# Patient Record
Sex: FEMALE | Race: BLACK OR AFRICAN AMERICAN | NOT HISPANIC OR LATINO | URBAN - METROPOLITAN AREA
[De-identification: names, ages, dates, MRNs, and addresses within clinical notes are randomized per-mention and may not be internally consistent; named-entity substitution may affect disease eponyms.]

---

## 2024-05-30 ENCOUNTER — EMERGENCY (EMERGENCY)
Facility: HOSPITAL | Age: 44
LOS: 1 days | Discharge: HOME | End: 2024-05-30
Attending: EMERGENCY MEDICINE | Admitting: EMERGENCY MEDICINE
Payer: COMMERCIAL

## 2024-05-30 VITALS
RESPIRATION RATE: 20 BRPM | OXYGEN SATURATION: 98 % | SYSTOLIC BLOOD PRESSURE: 147 MMHG | DIASTOLIC BLOOD PRESSURE: 79 MMHG | TEMPERATURE: 99 F | HEART RATE: 84 BPM

## 2024-05-30 PROCEDURE — 99285 EMERGENCY DEPT VISIT HI MDM: CPT

## 2024-05-31 VITALS
HEART RATE: 73 BPM | RESPIRATION RATE: 18 BRPM | SYSTOLIC BLOOD PRESSURE: 112 MMHG | TEMPERATURE: 98 F | DIASTOLIC BLOOD PRESSURE: 82 MMHG | OXYGEN SATURATION: 98 %

## 2024-05-31 LAB
ALBUMIN SERPL ELPH-MCNC: 4.4 G/DL — SIGNIFICANT CHANGE UP (ref 3.3–5)
ALP SERPL-CCNC: 81 U/L — SIGNIFICANT CHANGE UP (ref 40–120)
ALT FLD-CCNC: 10 U/L — SIGNIFICANT CHANGE UP (ref 4–33)
ANION GAP SERPL CALC-SCNC: 13 MMOL/L — SIGNIFICANT CHANGE UP (ref 7–14)
APPEARANCE UR: CLEAR — SIGNIFICANT CHANGE UP
AST SERPL-CCNC: 15 U/L — SIGNIFICANT CHANGE UP (ref 4–32)
BASOPHILS # BLD AUTO: 0.03 K/UL — SIGNIFICANT CHANGE UP (ref 0–0.2)
BASOPHILS NFR BLD AUTO: 0.4 % — SIGNIFICANT CHANGE UP (ref 0–2)
BILIRUB SERPL-MCNC: 0.5 MG/DL — SIGNIFICANT CHANGE UP (ref 0.2–1.2)
BILIRUB UR-MCNC: NEGATIVE — SIGNIFICANT CHANGE UP
BUN SERPL-MCNC: 11 MG/DL — SIGNIFICANT CHANGE UP (ref 7–23)
CALCIUM SERPL-MCNC: 9.7 MG/DL — SIGNIFICANT CHANGE UP (ref 8.4–10.5)
CHLORIDE SERPL-SCNC: 96 MMOL/L — LOW (ref 98–107)
CO2 SERPL-SCNC: 24 MMOL/L — SIGNIFICANT CHANGE UP (ref 22–31)
COLOR SPEC: YELLOW — SIGNIFICANT CHANGE UP
CREAT SERPL-MCNC: 0.82 MG/DL — SIGNIFICANT CHANGE UP (ref 0.5–1.3)
DIFF PNL FLD: NEGATIVE — SIGNIFICANT CHANGE UP
EGFR: 91 ML/MIN/1.73M2 — SIGNIFICANT CHANGE UP
EOSINOPHIL # BLD AUTO: 0.07 K/UL — SIGNIFICANT CHANGE UP (ref 0–0.5)
EOSINOPHIL NFR BLD AUTO: 1 % — SIGNIFICANT CHANGE UP (ref 0–6)
FLUAV AG NPH QL: SIGNIFICANT CHANGE UP
FLUBV AG NPH QL: SIGNIFICANT CHANGE UP
GLUCOSE SERPL-MCNC: 108 MG/DL — HIGH (ref 70–99)
GLUCOSE UR QL: NEGATIVE MG/DL — SIGNIFICANT CHANGE UP
HCG SERPL-ACNC: <1 MIU/ML — SIGNIFICANT CHANGE UP
HCT VFR BLD CALC: 45.5 % — HIGH (ref 34.5–45)
HGB BLD-MCNC: 14.6 G/DL — SIGNIFICANT CHANGE UP (ref 11.5–15.5)
IANC: 5.33 K/UL — SIGNIFICANT CHANGE UP (ref 1.8–7.4)
IMM GRANULOCYTES NFR BLD AUTO: 0.4 % — SIGNIFICANT CHANGE UP (ref 0–0.9)
KETONES UR-MCNC: NEGATIVE MG/DL — SIGNIFICANT CHANGE UP
LEUKOCYTE ESTERASE UR-ACNC: NEGATIVE — SIGNIFICANT CHANGE UP
LYMPHOCYTES # BLD AUTO: 1.21 K/UL — SIGNIFICANT CHANGE UP (ref 1–3.3)
LYMPHOCYTES # BLD AUTO: 17.1 % — SIGNIFICANT CHANGE UP (ref 13–44)
MCHC RBC-ENTMCNC: 26.5 PG — LOW (ref 27–34)
MCHC RBC-ENTMCNC: 32.1 GM/DL — SIGNIFICANT CHANGE UP (ref 32–36)
MCV RBC AUTO: 82.6 FL — SIGNIFICANT CHANGE UP (ref 80–100)
MONOCYTES # BLD AUTO: 0.41 K/UL — SIGNIFICANT CHANGE UP (ref 0–0.9)
MONOCYTES NFR BLD AUTO: 5.8 % — SIGNIFICANT CHANGE UP (ref 2–14)
NEUTROPHILS # BLD AUTO: 5.33 K/UL — SIGNIFICANT CHANGE UP (ref 1.8–7.4)
NEUTROPHILS NFR BLD AUTO: 75.3 % — SIGNIFICANT CHANGE UP (ref 43–77)
NITRITE UR-MCNC: NEGATIVE — SIGNIFICANT CHANGE UP
NRBC # BLD: 0 /100 WBCS — SIGNIFICANT CHANGE UP (ref 0–0)
NRBC # FLD: 0 K/UL — SIGNIFICANT CHANGE UP (ref 0–0)
PH UR: 8.5 (ref 5–8)
PLATELET # BLD AUTO: 330 K/UL — SIGNIFICANT CHANGE UP (ref 150–400)
POTASSIUM SERPL-MCNC: 5.2 MMOL/L — SIGNIFICANT CHANGE UP (ref 3.5–5.3)
POTASSIUM SERPL-SCNC: 5.2 MMOL/L — SIGNIFICANT CHANGE UP (ref 3.5–5.3)
PROT SERPL-MCNC: 7.9 G/DL — SIGNIFICANT CHANGE UP (ref 6–8.3)
PROT UR-MCNC: NEGATIVE MG/DL — SIGNIFICANT CHANGE UP
RBC # BLD: 5.51 M/UL — HIGH (ref 3.8–5.2)
RBC # FLD: 12.9 % — SIGNIFICANT CHANGE UP (ref 10.3–14.5)
RSV RNA NPH QL NAA+NON-PROBE: SIGNIFICANT CHANGE UP
SARS-COV-2 RNA SPEC QL NAA+PROBE: SIGNIFICANT CHANGE UP
SODIUM SERPL-SCNC: 133 MMOL/L — LOW (ref 135–145)
SP GR SPEC: 1.01 — SIGNIFICANT CHANGE UP (ref 1–1.03)
TROPONIN T, HIGH SENSITIVITY RESULT: <6 NG/L — SIGNIFICANT CHANGE UP
UROBILINOGEN FLD QL: 0.2 MG/DL — SIGNIFICANT CHANGE UP (ref 0.2–1)
WBC # BLD: 7.08 K/UL — SIGNIFICANT CHANGE UP (ref 3.8–10.5)
WBC # FLD AUTO: 7.08 K/UL — SIGNIFICANT CHANGE UP (ref 3.8–10.5)

## 2024-05-31 PROCEDURE — 71046 X-RAY EXAM CHEST 2 VIEWS: CPT | Mod: 26

## 2024-05-31 PROCEDURE — 74177 CT ABD & PELVIS W/CONTRAST: CPT | Mod: 26,MC

## 2024-05-31 PROCEDURE — 93010 ELECTROCARDIOGRAM REPORT: CPT

## 2024-05-31 RX ORDER — KETOROLAC TROMETHAMINE 30 MG/ML
15 SYRINGE (ML) INJECTION ONCE
Refills: 0 | Status: DISCONTINUED | OUTPATIENT
Start: 2024-05-31 | End: 2024-05-31

## 2024-05-31 RX ORDER — METOCLOPRAMIDE HCL 10 MG
10 TABLET ORAL ONCE
Refills: 0 | Status: COMPLETED | OUTPATIENT
Start: 2024-05-31 | End: 2024-05-31

## 2024-05-31 RX ORDER — ONDANSETRON 8 MG/1
1 TABLET, FILM COATED ORAL
Qty: 6 | Refills: 0
Start: 2024-05-31 | End: 2024-06-01

## 2024-05-31 RX ORDER — SODIUM CHLORIDE 9 MG/ML
1000 INJECTION INTRAMUSCULAR; INTRAVENOUS; SUBCUTANEOUS ONCE
Refills: 0 | Status: COMPLETED | OUTPATIENT
Start: 2024-05-31 | End: 2024-05-31

## 2024-05-31 RX ORDER — ACETAMINOPHEN 500 MG
1000 TABLET ORAL ONCE
Refills: 0 | Status: COMPLETED | OUTPATIENT
Start: 2024-05-31 | End: 2024-05-31

## 2024-05-31 RX ORDER — MAGNESIUM SULFATE 500 MG/ML
1 VIAL (ML) INJECTION ONCE
Refills: 0 | Status: COMPLETED | OUTPATIENT
Start: 2024-05-31 | End: 2024-05-31

## 2024-05-31 RX ADMIN — SODIUM CHLORIDE 1000 MILLILITER(S): 9 INJECTION INTRAMUSCULAR; INTRAVENOUS; SUBCUTANEOUS at 02:42

## 2024-05-31 RX ADMIN — Medication 100 GRAM(S): at 05:23

## 2024-05-31 RX ADMIN — Medication 15 MILLIGRAM(S): at 02:42

## 2024-05-31 RX ADMIN — Medication 10 MILLIGRAM(S): at 02:42

## 2024-05-31 RX ADMIN — Medication 15 MILLIGRAM(S): at 05:23

## 2024-05-31 RX ADMIN — Medication 400 MILLIGRAM(S): at 02:42

## 2024-05-31 NOTE — ED ADULT NURSE NOTE - NSFALLUNIVINTERV_ED_ALL_ED
Bed/Stretcher in lowest position, wheels locked, appropriate side rails in place/Call bell, personal items and telephone in reach/Instruct patient to call for assistance before getting out of bed/chair/stretcher/Non-slip footwear applied when patient is off stretcher/[place] to call system/Physically safe environment - no spills, clutter or unnecessary equipment/Purposeful proactive rounding/Room/bathroom lighting operational, light cord in reach

## 2024-05-31 NOTE — ED PROVIDER NOTE - ATTENDING CONTRIBUTION TO CARE
HPI: 43-year-old female with no past medical history and only knee surgery that presents with right lower quadrant abdominal pain nonradiating associated with nausea, vomiting, chest pain, muscle aches, chills, cough, headaches for 3 days.  Patient states that started having symptoms gradually onset but worsening over the last 3 days.  Tried Excedrin and ibuprofen at home last taken 2 PM which is approximately 12 hours prior to arrival but little relief.  Has been otherwise able to tolerate p.o. although today started having nausea vomiting while in the waiting room.  Reports lightheadedness but no recent trauma.  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  Denies any vision changes, shortness of breath, joint pains, dysuria or diarrhea.  LMP was middle of the month but does not think she is pregnant but unsure not on birth control.  Denies any smoking drinking or drugs.  Usually has 1 bowel movement every 1 to 2 days and last bowel movement was normal today without any blood or diarrhea.    EXAM: Uncomfortable appearing but awake and alert.  Speaking full sentences.  Head is atraumatic eyes extraocular moods intact lungs are equal bilaterally with normal breath sounds heart is regular rate and rhythm abdomen is diffusely soft although tender in the right lower quadrant without rebound or guarding or masses.  Also tender in the suprapubic region.  No rebound or guarding.  Positive CVA tenderness bilaterally.  Skin without changes.    MDM: 43-year-old female with no past medical history and only knee surgery that presents with right lower quadrant abdominal pain and nausea vomiting while in the waiting room but also has had chest pain, muscle soreness and aches chills and cough and headache for the last 3 days.  Concern is for infectious etiology at this time.  Her vital signs are noted to be hypertensive but borderline fever with a oral temp of 99.3 but not tachycardic.  The concern is for possible colitis versus appendicitis versus UTI versus flu/COVID versus pneumonia.  At this time will obtain workup provide IV fluids and medications obtain CT and x-ray and reassess.

## 2024-05-31 NOTE — ED PROVIDER NOTE - CLINICAL SUMMARY MEDICAL DECISION MAKING FREE TEXT BOX
43-year-old female, previously healthy, presenting with nausea, vomiting, chest pain, muscle aches, chills, cough, headache for 3 days.      VS show HTN otherwise WNL.     GENERAL: NAD, lying in bed comfortably  HEAD:  Atraumatic, Normocephalic  EYES: EOMI, conjunctiva and sclera clear  ENT: Moist mucous membranes  NECK: Supple  CHEST/LUNG: Unlabored respirations. Clear to auscultation bilaterally. No rales, rhonchi, wheezing, or rubs  HEART: Regular rate and rhythm. No murmurs, rubs, or gallops  ABDOMEN: Soft, nondistended, nontender  EXTREMITIES:  No cyanosis or edema. Brisk capillary refill  NERVOUS SYSTEM:  No focal deficits. A&Ox3  SKIN: No rashes or lesions     ddx include but not limited to viral gastroenteritis/uri, pregnancy. Will assess for ACS, PNA, PTX. Will assess for dehydration 43-year-old female, previously healthy, presenting with nausea, vomiting, chest pain, muscle aches, chills, cough, headache for 3 days.      VS show HTN otherwise WNL.     GENERAL: NAD, lying in bed comfortably  HEAD:  Atraumatic, Normocephalic  EYES: EOMI, conjunctiva and sclera clear  ENT: Moist mucous membranes  NECK: Supple  CHEST/LUNG: Unlabored respirations. Clear to auscultation bilaterally. No rales, rhonchi, wheezing, or rubs  HEART: Regular rate and rhythm. No murmurs, rubs, or gallops  ABDOMEN: Soft, nondistended, RLQ ttp  EXTREMITIES:  No cyanosis or edema. Brisk capillary refill  NERVOUS SYSTEM:  No focal deficits. A&Ox3  SKIN: No rashes or lesions     ddx include but not limited to viral gastroenteritis/uri, pregnancy, appendicitis. Will assess for ACS, PNA, PTX. Will assess for dehydration 43-year-old female, previously healthy, presenting with nausea, vomiting, chest pain, muscle aches, chills, cough, headache for 3 days.      VS show HTN otherwise WNL.     GENERAL: NAD, lying in bed comfortably  HEAD:  Atraumatic, Normocephalic  EYES: EOMI, conjunctiva and sclera clear  ENT: Moist mucous membranes  NECK: Supple  CHEST/LUNG: Unlabored respirations. Clear to auscultation bilaterally. No rales, rhonchi, wheezing, or rubs  HEART: Regular rate and rhythm. No murmurs, rubs, or gallops  ABDOMEN: Soft, nondistended, RLQ ttp  EXTREMITIES:  No cyanosis or edema. Brisk capillary refill  NERVOUS SYSTEM:  No focal deficits. A&Ox3. No nuchal rigidity  SKIN: No rashes or lesions     ddx include but not limited to viral gastroenteritis/uri, pregnancy, appendicitis. Will assess for ACS, PNA, PTX. Will assess for dehydration

## 2024-05-31 NOTE — ED PROVIDER NOTE - NSFOLLOWUPINSTRUCTIONS_ED_ALL_ED_FT
You were seen in the emergency department for vomiting      1) Follow-up with your primary care provider in 1-2 days.     2) Continue to take all medications as prescribed.    3) Rest and stay hydrated. Pain can be managed with Acetaminophen (aka Tylenol)  over the counter as directed.    4) Return to the ER for any new or worsening symptoms. Some symptoms to look out for include worsening chest pain, shortness of breath, breaking out into sweats, passing out, vomiting, or any other worsening concerning symptoms.       Please read all attached patient information.

## 2024-05-31 NOTE — ED PROVIDER NOTE - OBJECTIVE STATEMENT
43-year-old female, previously healthy, presenting with nausea, vomiting, chest pain, muscle aches, chills, cough, headache for 3 days.  Patient reports that she has been having poor p.o. tolerance last 2 days.  Reports lightheadedness.  Denies any changes in vision, shortness of breath, joint pains, dysuria, diarrhea, recent travel, sick contacts.

## 2024-05-31 NOTE — ED ADULT NURSE REASSESSMENT NOTE - NS ED NURSE REASSESS COMMENT FT1
Per ED Provider, Pt cleared to be d/c home. Peripheral IV discontinued at this time. A/Ox4 ambulated w/ steady gait. Vital signs stable.

## 2024-05-31 NOTE — ED PROVIDER NOTE - PATIENT PORTAL LINK FT
You can access the FollowMyHealth Patient Portal offered by [facility] by registering at the following website: [email or web address]. By joining [facility]’s FollowMyHealth portal, you will also be able to view your health information using other applications (apps) compatible with our system.

## 2024-05-31 NOTE — ED PROVIDER NOTE - PHYSICAL EXAMINATION
GENERAL: NAD, lying in bed comfortably  HEAD:  Atraumatic, Normocephalic  EYES: EOMI, conjunctiva and sclera clear  ENT: Moist mucous membranes  NECK: Supple  CHEST/LUNG: Unlabored respirations. Clear to auscultation bilaterally. No rales, rhonchi, wheezing, or rubs  HEART: Regular rate and rhythm. No murmurs, rubs, or gallops  ABDOMEN: Soft, nondistended, nontender  EXTREMITIES:  No cyanosis or edema. Brisk capillary refill  NERVOUS SYSTEM:  No focal deficits. A&Ox3  SKIN: No rashes or lesions GENERAL: NAD, lying in bed comfortably  HEAD:  Atraumatic, Normocephalic  EYES: EOMI, conjunctiva and sclera clear  ENT: Moist mucous membranes  NECK: Supple  CHEST/LUNG: Unlabored respirations. Clear to auscultation bilaterally. No rales, rhonchi, wheezing, or rubs  HEART: Regular rate and rhythm. No murmurs, rubs, or gallops  ABDOMEN: Soft, nondistended, nontender  EXTREMITIES:  No cyanosis or edema. Brisk capillary refill  NERVOUS SYSTEM:  No focal deficits. A&Ox3. No nuchal rigidity  SKIN: No rashes or lesions

## 2024-05-31 NOTE — ED ADULT NURSE NOTE - OBJECTIVE STATEMENT
Pt received from triage on stretcher w/  at the bedside, A/Ox4 answers all questions appropriately. C/O Chest pain w/ headache x 3 days. Pt denies SOB during initial assessment, breathing is even and unlabored on room air. Abdomen is soft and non-distended. Pt ambulates independently at baseline, moves all four extremities on command, skin is intact. Pt resting comfortably at the bedside, No apparent acute visible distress noted on initial assessment. Vital signs stable, NKA to medications. Denies significant PMHx. 20g peripheral IV placed on left forearm, labs obtained and sent, meds administered per eMAR. Pending XR/CT imaging

## 2024-06-01 LAB
CULTURE RESULTS: SIGNIFICANT CHANGE UP
SPECIMEN SOURCE: SIGNIFICANT CHANGE UP

## 2024-11-08 ENCOUNTER — EMERGENCY (EMERGENCY)
Facility: HOSPITAL | Age: 44
LOS: 1 days | Discharge: HOME | End: 2024-11-08
Attending: STUDENT IN AN ORGANIZED HEALTH CARE EDUCATION/TRAINING PROGRAM | Admitting: STUDENT IN AN ORGANIZED HEALTH CARE EDUCATION/TRAINING PROGRAM
Payer: COMMERCIAL

## 2024-11-08 VITALS
DIASTOLIC BLOOD PRESSURE: 68 MMHG | HEART RATE: 80 BPM | SYSTOLIC BLOOD PRESSURE: 104 MMHG | RESPIRATION RATE: 18 BRPM | OXYGEN SATURATION: 100 % | TEMPERATURE: 99 F

## 2024-11-08 VITALS
TEMPERATURE: 100 F | HEIGHT: 69 IN | RESPIRATION RATE: 18 BRPM | WEIGHT: 250 LBS | SYSTOLIC BLOOD PRESSURE: 121 MMHG | HEART RATE: 103 BPM | OXYGEN SATURATION: 99 % | DIASTOLIC BLOOD PRESSURE: 84 MMHG

## 2024-11-08 PROBLEM — Z78.9 OTHER SPECIFIED HEALTH STATUS: Chronic | Status: ACTIVE | Noted: 2024-05-31

## 2024-11-08 LAB
ALBUMIN SERPL ELPH-MCNC: 3.8 G/DL — SIGNIFICANT CHANGE UP (ref 3.3–5)
ALP SERPL-CCNC: 66 U/L — SIGNIFICANT CHANGE UP (ref 40–120)
ALT FLD-CCNC: 7 U/L — SIGNIFICANT CHANGE UP (ref 4–33)
ANION GAP SERPL CALC-SCNC: 14 MMOL/L — SIGNIFICANT CHANGE UP (ref 7–14)
AST SERPL-CCNC: 15 U/L — SIGNIFICANT CHANGE UP (ref 4–32)
BASOPHILS # BLD AUTO: 0.05 K/UL — SIGNIFICANT CHANGE UP (ref 0–0.2)
BASOPHILS NFR BLD AUTO: 0.5 % — SIGNIFICANT CHANGE UP (ref 0–2)
BILIRUB SERPL-MCNC: 0.6 MG/DL — SIGNIFICANT CHANGE UP (ref 0.2–1.2)
BLOOD GAS VENOUS COMPREHENSIVE RESULT: SIGNIFICANT CHANGE UP
BUN SERPL-MCNC: 6 MG/DL — LOW (ref 7–23)
CALCIUM SERPL-MCNC: 8.4 MG/DL — SIGNIFICANT CHANGE UP (ref 8.4–10.5)
CHLORIDE SERPL-SCNC: 98 MMOL/L — SIGNIFICANT CHANGE UP (ref 98–107)
CO2 SERPL-SCNC: 19 MMOL/L — LOW (ref 22–31)
CREAT SERPL-MCNC: 0.69 MG/DL — SIGNIFICANT CHANGE UP (ref 0.5–1.3)
EGFR: 110 ML/MIN/1.73M2 — SIGNIFICANT CHANGE UP
EOSINOPHIL # BLD AUTO: 0 K/UL — SIGNIFICANT CHANGE UP (ref 0–0.5)
EOSINOPHIL NFR BLD AUTO: 0 % — SIGNIFICANT CHANGE UP (ref 0–6)
FLUAV AG NPH QL: SIGNIFICANT CHANGE UP
FLUBV AG NPH QL: SIGNIFICANT CHANGE UP
GLUCOSE SERPL-MCNC: 105 MG/DL — HIGH (ref 70–99)
HCG SERPL-ACNC: <1 MIU/ML — SIGNIFICANT CHANGE UP
HCT VFR BLD CALC: 38.5 % — SIGNIFICANT CHANGE UP (ref 34.5–45)
HGB BLD-MCNC: 12.2 G/DL — SIGNIFICANT CHANGE UP (ref 11.5–15.5)
IANC: 7.57 K/UL — HIGH (ref 1.8–7.4)
IMM GRANULOCYTES NFR BLD AUTO: 0.4 % — SIGNIFICANT CHANGE UP (ref 0–0.9)
LYMPHOCYTES # BLD AUTO: 1.97 K/UL — SIGNIFICANT CHANGE UP (ref 1–3.3)
LYMPHOCYTES # BLD AUTO: 18.9 % — SIGNIFICANT CHANGE UP (ref 13–44)
MCHC RBC-ENTMCNC: 26.2 PG — LOW (ref 27–34)
MCHC RBC-ENTMCNC: 31.7 G/DL — LOW (ref 32–36)
MCV RBC AUTO: 82.6 FL — SIGNIFICANT CHANGE UP (ref 80–100)
MONOCYTES # BLD AUTO: 0.79 K/UL — SIGNIFICANT CHANGE UP (ref 0–0.9)
MONOCYTES NFR BLD AUTO: 7.6 % — SIGNIFICANT CHANGE UP (ref 2–14)
NEUTROPHILS # BLD AUTO: 7.57 K/UL — HIGH (ref 1.8–7.4)
NEUTROPHILS NFR BLD AUTO: 72.6 % — SIGNIFICANT CHANGE UP (ref 43–77)
NRBC # BLD: 0 /100 WBCS — SIGNIFICANT CHANGE UP (ref 0–0)
NRBC # FLD: 0 K/UL — SIGNIFICANT CHANGE UP (ref 0–0)
PLATELET # BLD AUTO: 320 K/UL — SIGNIFICANT CHANGE UP (ref 150–400)
POTASSIUM SERPL-MCNC: 3.9 MMOL/L — SIGNIFICANT CHANGE UP (ref 3.5–5.3)
POTASSIUM SERPL-SCNC: 3.9 MMOL/L — SIGNIFICANT CHANGE UP (ref 3.5–5.3)
PROT SERPL-MCNC: 7 G/DL — SIGNIFICANT CHANGE UP (ref 6–8.3)
RBC # BLD: 4.66 M/UL — SIGNIFICANT CHANGE UP (ref 3.8–5.2)
RBC # FLD: 13.4 % — SIGNIFICANT CHANGE UP (ref 10.3–14.5)
RSV RNA NPH QL NAA+NON-PROBE: SIGNIFICANT CHANGE UP
SARS-COV-2 RNA SPEC QL NAA+PROBE: SIGNIFICANT CHANGE UP
SODIUM SERPL-SCNC: 131 MMOL/L — LOW (ref 135–145)
WBC # BLD: 10.42 K/UL — SIGNIFICANT CHANGE UP (ref 3.8–10.5)
WBC # FLD AUTO: 10.42 K/UL — SIGNIFICANT CHANGE UP (ref 3.8–10.5)

## 2024-11-08 PROCEDURE — 99284 EMERGENCY DEPT VISIT MOD MDM: CPT

## 2024-11-08 PROCEDURE — 71046 X-RAY EXAM CHEST 2 VIEWS: CPT | Mod: 26

## 2024-11-08 RX ORDER — ACETAMINOPHEN 500 MG
1000 TABLET ORAL ONCE
Refills: 0 | Status: COMPLETED | OUTPATIENT
Start: 2024-11-08 | End: 2024-11-08

## 2024-11-08 RX ORDER — SODIUM CHLORIDE 9 MG/ML
1000 INJECTION, SOLUTION INTRAMUSCULAR; INTRAVENOUS; SUBCUTANEOUS ONCE
Refills: 0 | Status: COMPLETED | OUTPATIENT
Start: 2024-11-08 | End: 2024-11-08

## 2024-11-08 RX ORDER — METOCLOPRAMIDE HCL 10 MG
10 TABLET ORAL ONCE
Refills: 0 | Status: COMPLETED | OUTPATIENT
Start: 2024-11-08 | End: 2024-11-08

## 2024-11-08 RX ADMIN — Medication 400 MILLIGRAM(S): at 03:14

## 2024-11-08 RX ADMIN — Medication 10 MILLIGRAM(S): at 03:12

## 2024-11-08 RX ADMIN — SODIUM CHLORIDE 1000 MILLILITER(S): 9 INJECTION, SOLUTION INTRAMUSCULAR; INTRAVENOUS; SUBCUTANEOUS at 03:11

## 2024-11-08 NOTE — ED ADULT NURSE NOTE - OBJECTIVE STATEMENT
Received pt in room 17. Pt is A&Ox4 with no past medical history. Pt came to the ED due to 3 days of flu-like symptoms including body aches, fever, chills, headache with photosensitivity, nausea. Pt reports that she has been taking advil but it has not gotten rid of symptoms. Pt reports 3 weeks ago she went to [place] but wasn't in contact with anyone sick or anyone around her was sick. Pt breathing is equal and nonlabored. pt abdomen is soft and nondistended. Pt denies any chest pain, SOB, vomiting, diarrhea, pt has a 20g to the left forearm. Pt safety maintained.

## 2024-11-08 NOTE — ED PROVIDER NOTE - CLINICAL SUMMARY MEDICAL DECISION MAKING FREE TEXT BOX
43 y/o female no pmh presents with 3 days of body aches, chills, fever, headache, nausea. She is hemodynamically stable, afebrile (oral 100.1 F), on exam she is generally well appearing with moist mucous membranes, clear lungs, soft non-tender abdomen. Likely viral illness given mild symptoms, short course. Will give sx control, get cxr, labs, reassess.

## 2024-11-08 NOTE — ED PROVIDER NOTE - PHYSICAL EXAMINATION
General: Alert and Orientated x 3. No apparent distress.  Eyes: No scleral icterus.   ENT: Mucous membranes moist  Neck: No neck stiffness   Cardiac: No murmurs appreciated.   Pulmonary: CTA bilaterally. No increased WOB.   Abdominal: Soft, Non-distended non-tender   Neurologic: No focal sensory or motor deficits.  Extremities: No lower extremity edema, bruising, soreness   Skin: Color appropriate for race. Intact, warm, and well-perfused.  Psychiatric: Appropriate mood and affect. No apparent risk to self or others.

## 2024-11-08 NOTE — ED PROVIDER NOTE - ATTENDING CONTRIBUTION TO CARE
45 yo F no reported pmhx presenting with complaints of nausea, vomiting, abdominal pain, cough, congestion, fevers/chills, body aches for the past few days. Also with gradual onset HA, only mildly improved with ibuprofen. Denies known sick contacts. Recent travel 3 weeks ago. No one at home with similar symptoms.     VITALS: reviewed  GEN: NAD, A & O x 4  HEAD/EYES: NCAT, EOMI, anicteric sclerae,   ENT: mucus membranes moist, oropharynx WNL, trachea midline,  RESP: lungs CTA with equal breath sounds bilaterally, chest wall nontender and atraumatic  CV: heart with reg rhythm S1, S2, distal pulses intact and symmetric bilaterally  ABDOMEN: soft, nondistended, nontender, no palpable masses  : no CVAT  MSK: extremities atraumatic and nontender, no edema, no asymmetry.  SKIN: warm, dry, no rash, no bruising, no cyanosis. color appropriate for ethnicity  NEURO: alert, mentating appropriately, no facial asymmetry.   PSYCH: Affect appropriate    Likely viral syndrome. No meningismus on exam, pt non-toxic appearing. Plan for labs, meds, fluids, PO chall, and reassess. Likely dc home with return recs

## 2024-11-08 NOTE — ED PROVIDER NOTE - OBJECTIVE STATEMENT
43 y/o female no pmh presents with 3 days of flu-like symptoms including body aches, fever, chills, headache, nausea. She has been taking advil but it has not gotten rid of symptoms. No blurry vision, neck pain, chest pain, SOB, abdominal pain, diarrhea, leg swelling. Allergic to penicillins. No recent travel or sick contacts.

## 2024-11-08 NOTE — ED PROVIDER NOTE - PATIENT PORTAL LINK FT
You can access the FollowMyHealth Patient Portal offered by [facility] by registering at the following website: [email or web address]. By joining [facility]’s FollowMyHealth portal, you will also be able to view your health information using other applications (apps) compatible with our system.